# Patient Record
Sex: FEMALE | Race: WHITE | NOT HISPANIC OR LATINO | Employment: OTHER | URBAN - METROPOLITAN AREA
[De-identification: names, ages, dates, MRNs, and addresses within clinical notes are randomized per-mention and may not be internally consistent; named-entity substitution may affect disease eponyms.]

---

## 2017-05-15 ENCOUNTER — HOSPITAL ENCOUNTER (OUTPATIENT)
Dept: OTHER | Facility: HOSPITAL | Age: 67
Setting detail: SPECIMEN
Discharge: HOME | End: 2017-05-15
Attending: INTERNAL MEDICINE | Admitting: INTERNAL MEDICINE

## 2017-05-15 ENCOUNTER — ON CAMPUS - OUTPATIENT (AMBULATORY)
Dept: URBAN - METROPOLITAN AREA HOSPITAL 2 | Facility: HOSPITAL | Age: 67
End: 2017-05-15
Payer: COMMERCIAL

## 2017-05-15 ENCOUNTER — OFFICE (AMBULATORY)
Dept: URBAN - METROPOLITAN AREA CLINIC 64 | Facility: CLINIC | Age: 67
End: 2017-05-15
Payer: COMMERCIAL

## 2017-05-15 VITALS
DIASTOLIC BLOOD PRESSURE: 102 MMHG | DIASTOLIC BLOOD PRESSURE: 47 MMHG | RESPIRATION RATE: 16 BRPM | SYSTOLIC BLOOD PRESSURE: 181 MMHG | DIASTOLIC BLOOD PRESSURE: 52 MMHG | OXYGEN SATURATION: 97 % | TEMPERATURE: 98.1 F | SYSTOLIC BLOOD PRESSURE: 157 MMHG | SYSTOLIC BLOOD PRESSURE: 145 MMHG | DIASTOLIC BLOOD PRESSURE: 56 MMHG | SYSTOLIC BLOOD PRESSURE: 147 MMHG | HEART RATE: 66 BPM | OXYGEN SATURATION: 100 % | HEART RATE: 62 BPM | DIASTOLIC BLOOD PRESSURE: 65 MMHG | HEART RATE: 67 BPM | HEART RATE: 69 BPM | OXYGEN SATURATION: 95 % | HEIGHT: 62 IN | OXYGEN SATURATION: 99 % | RESPIRATION RATE: 15 BRPM | HEART RATE: 61 BPM | SYSTOLIC BLOOD PRESSURE: 117 MMHG | HEART RATE: 64 BPM | DIASTOLIC BLOOD PRESSURE: 74 MMHG | SYSTOLIC BLOOD PRESSURE: 122 MMHG | SYSTOLIC BLOOD PRESSURE: 166 MMHG | HEART RATE: 71 BPM | SYSTOLIC BLOOD PRESSURE: 101 MMHG | DIASTOLIC BLOOD PRESSURE: 97 MMHG | OXYGEN SATURATION: 98 % | DIASTOLIC BLOOD PRESSURE: 77 MMHG | WEIGHT: 166 LBS | DIASTOLIC BLOOD PRESSURE: 76 MMHG

## 2017-05-15 DIAGNOSIS — K62.1 RECTAL POLYP: ICD-10-CM

## 2017-05-15 DIAGNOSIS — K64.0 FIRST DEGREE HEMORRHOIDS: ICD-10-CM

## 2017-05-15 DIAGNOSIS — Z12.11 ENCOUNTER FOR SCREENING FOR MALIGNANT NEOPLASM OF COLON: ICD-10-CM

## 2017-05-15 LAB
GI HISTOLOGY: A. UNSPECIFIED: (no result)
GI HISTOLOGY: PDF REPORT: (no result)

## 2017-05-15 PROCEDURE — 45380 COLONOSCOPY AND BIOPSY: CPT | Mod: PT | Performed by: INTERNAL MEDICINE

## 2017-05-15 PROCEDURE — 88305 TISSUE EXAM BY PATHOLOGIST: CPT | Mod: 26 | Performed by: INTERNAL MEDICINE

## 2017-05-15 RX ADMIN — PROPOFOL: 10 INJECTION, EMULSION INTRAVENOUS at 13:45

## 2022-08-02 ENCOUNTER — OFFICE VISIT (OUTPATIENT)
Dept: NEUROLOGY | Facility: CLINIC | Age: 72
End: 2022-08-02

## 2022-08-02 VITALS
BODY MASS INDEX: 27.05 KG/M2 | HEART RATE: 62 BPM | SYSTOLIC BLOOD PRESSURE: 148 MMHG | WEIGHT: 147 LBS | HEIGHT: 62 IN | DIASTOLIC BLOOD PRESSURE: 76 MMHG | TEMPERATURE: 98.6 F

## 2022-08-02 DIAGNOSIS — R25.1 TREMOR: Primary | Chronic | ICD-10-CM

## 2022-08-02 PROCEDURE — 99202 OFFICE O/P NEW SF 15 MIN: CPT | Performed by: NURSE PRACTITIONER

## 2022-08-02 RX ORDER — MULTIPLE VITAMINS W/ MINERALS TAB 9MG-400MCG
1 TAB ORAL DAILY
COMMUNITY

## 2022-08-02 RX ORDER — PRIMIDONE 50 MG/1
TABLET ORAL
Qty: 120 TABLET | Refills: 2 | Status: SHIPPED | OUTPATIENT
Start: 2022-08-02 | End: 2022-08-02

## 2022-08-02 RX ORDER — MELATONIN
3000 DAILY
COMMUNITY

## 2022-08-02 RX ORDER — AMOXICILLIN 500 MG
1200 CAPSULE ORAL DAILY
COMMUNITY

## 2022-08-02 RX ORDER — PRIMIDONE 50 MG/1
TABLET ORAL
Qty: 120 TABLET | Refills: 3 | Status: SHIPPED | OUTPATIENT
Start: 2022-08-02 | End: 2022-08-02

## 2022-08-02 RX ORDER — MULTIVIT WITH MINERALS/LUTEIN
1000 TABLET ORAL DAILY
COMMUNITY

## 2022-08-02 RX ORDER — PRIMIDONE 50 MG/1
TABLET ORAL
Qty: 120 TABLET | Refills: 3 | Status: SHIPPED | OUTPATIENT
Start: 2022-08-02

## 2022-08-02 NOTE — PROGRESS NOTES
Subjective:     Patient ID: Marisela Alvarenga is a 71 y.o. female.    CHIEF COMPLAINT: Bilateral hand tremor    History of Present Illness Ms. Alvarenga is a very pleasant 71-year-old  female who was referred by her PCP PALOMO Hardin for essential tremor.  The patient states that she has had tremor off and on for several years but over the last 8 months is gotten markedly worse.  She states it is worse in the right hand versus the left and is causing difficulty eating and writing and doing fine motor activities.  She denies any trouble walking, freezing up, decreased sense of smell, cogwheel rigidity or shoulder stiffness.  She is unsure of any family history of tremor.  She denies any head or voice tremor.      Complaint: Tremors, hands R worse  Onset: years but has gotten worse in last 8 months  Location: BUE, R>L   Quality: Moderate   Severity: Can be severe   Duration: 24/7    Frequency:  Daily   Timing: in am   Context: Worse with anxious   Modifying factors: stress and nervous makes it worse   Associated Signs and symptoms:      Current meds: None    Testing:    EXAMINATION: CT HEAD WITHOUT CONTRAST.   DATE:04/20/2022   INDICATION: tremors COMPARISON: None available   TECHNIQUE: A routine noncontrast cranial CT scan was performed. Radiation dose reduction techniques were utilized per ALARA protocol.   FINDINGS: No hemorrhage, infarct, hydrocephalus, edema, mass, midline shift, or hyperdense vessel. Mild calcified atherosclerosis of the intracranial arteries. No orbital abnormality. Negative sella. The sinuses, middle ear, and mastoids are aerated.   CONCLUSION:   1. No acute abnormality.   2. Mild calcified atherosclerosis.   3. No specific diagnostic abnormality.   Dictated by: Dennis Madrid M.D.   Images and Report reviewed and interpreted by: Dennis Madrid M.D. 4/20/2022    Patient also has a TSH ordered by her PCP.      The following portions of the patient's history were reviewed and updated as  appropriate: allergies, current medications, past family history, past medical history, past social history, past surgical history and problem list.      Family History   Problem Relation Age of Onset   • Hypertension Mother    • Stroke Father    • Arthritis Father    • Diabetes Father    • Heart disease Father    • Stroke Brother        Past Medical History:   Diagnosis Date   • Breast cancer (HCC)        Social History     Socioeconomic History   • Marital status:    Tobacco Use   • Smoking status: Never Smoker   • Smokeless tobacco: Never Used   Vaping Use   • Vaping Use: Never used   Substance and Sexual Activity   • Alcohol use: Never   • Drug use: Never         Current Outpatient Medications:   •  Apple Cider Vinegar 300 MG tablet, Take  by mouth., Disp: , Rfl:   •  Calcium Carbonate-Vitamin D (calcium-vitamin D) 500-200 MG-UNIT tablet per tablet, Take 1 tablet by mouth Daily., Disp: , Rfl:   •  cholecalciferol (VITAMIN D3) 25 MCG (1000 UT) tablet, Take 3,000 Units by mouth Daily., Disp: , Rfl:   •  multivitamin with minerals (HAIR SKIN AND NAILS FORMULA PO), Take 1 tablet by mouth Daily., Disp: , Rfl:   •  Omega-3 Fatty Acids (fish oil) 1200 MG capsule capsule, Take 1,200 mg by mouth Daily., Disp: , Rfl:   •  primidone (MYSOLINE) 50 MG tablet, Take 1 tab at bedtime for 1 wk, then 1 tab twice a day for 1 wk, then 2 tab twice a day, Disp: 120 tablet, Rfl: 3  •  vitamin E 1000 UNIT capsule, Take 1,000 Units by mouth Daily., Disp: , Rfl:     Review of Systems   Constitutional: Negative.    HENT: Positive for postnasal drip.    Eyes: Negative.    Respiratory: Negative.    Cardiovascular: Negative.    Gastrointestinal: Positive for constipation.   Endocrine: Negative.    Genitourinary: Positive for urgency.   Musculoskeletal: Positive for neck pain and neck stiffness.   Neurological: Positive for dizziness, light-headedness and headaches.   Psychiatric/Behavioral: Positive for sleep disturbance. The patient  is nervous/anxious.         I have reviewed ROS completed by medical assistant.     Objective:    Neurologic Exam     Mental Status   Oriented to person, place, and time.   Speech: speech is normal     Cranial Nerves     CN III, IV, VI   Pupils are equal, round, and reactive to light.    Gait, Coordination, and Reflexes     Gait  Gait: normal    Coordination   Finger to nose coordination: normal  Tandem walking coordination: normal    Reflexes   Right brachioradialis: 2+  Left brachioradialis: 2+  Right biceps: 1+  Left biceps: 1+  Right triceps: 1+  Left triceps: 1+  Right patellar: 2+  Left patellar: 2+  Right achilles: 2+  Left achilles: 2+      Physical Exam  Vitals and nursing note reviewed.   Constitutional:       Appearance: Normal appearance. She is well-developed and well-groomed.   HENT:      Head: Normocephalic.      Right Ear: Hearing normal.      Left Ear: Hearing normal.      Nose: Nose normal.      Mouth/Throat:      Lips: Pink.      Mouth: Mucous membranes are moist.   Eyes:      General: No visual field deficit.     Extraocular Movements: Extraocular movements intact.      Pupils: Pupils are equal, round, and reactive to light.   Cardiovascular:      Rate and Rhythm: Normal rate and regular rhythm.      Pulses: Normal pulses.      Heart sounds: Normal heart sounds, S1 normal and S2 normal.   Pulmonary:      Effort: Pulmonary effort is normal.      Breath sounds: Normal breath sounds.   Musculoskeletal:         General: Normal range of motion.      Cervical back: Full passive range of motion without pain and normal range of motion.      Comments: 5/5 all exremities including , plantar and dorsiflexion    Skin:     General: Skin is warm and dry.   Neurological:      General: No focal deficit present.      Mental Status: She is alert and oriented to person, place, and time.      Cranial Nerves: Cranial nerves are intact. No cranial nerve deficit, dysarthria or facial asymmetry.      Sensory:  Sensation is intact. No sensory deficit.      Motor: Tremor (BUE: R>L ) present. No weakness, atrophy, abnormal muscle tone, seizure activity or pronator drift.      Coordination: Coordination is intact. Romberg sign negative. Coordination normal. Finger-Nose-Finger Test and Heel to Shin Test normal. Rapid alternating movements normal.      Gait: Gait is intact. Gait and tandem walk normal.      Deep Tendon Reflexes: Reflexes normal. Babinski sign absent on the right side. Babinski sign absent on the left side.      Reflex Scores:       Tricep reflexes are 1+ on the right side and 1+ on the left side.       Bicep reflexes are 1+ on the right side and 1+ on the left side.       Brachioradialis reflexes are 2+ on the right side and 2+ on the left side.       Patellar reflexes are 2+ on the right side and 2+ on the left side.       Achilles reflexes are 2+ on the right side and 2+ on the left side.  Psychiatric:         Attention and Perception: Attention and perception normal.         Mood and Affect: Mood normal.         Speech: Speech normal.         Behavior: Behavior normal. Behavior is cooperative.         Assessment/Plan:   She will be prescribed primidone 50 mg titrating up to a maximum dose of 100 mg twice daily.  She is to call if she has any problems with the medication.  She was notified that it can make her drowsy and to increase  The dose on the weekend.  She was also told to hold at any level that controlled the tremor.  She is to call with any questions or concerns.    Diagnoses and all orders for this visit:    1. Tremor (Primary)  -     Discontinue: primidone (MYSOLINE) 50 MG tablet; Take 1 tab at bedtime for 1 wk, then 1 tab twice a day for 1 wk, then 2 tab twice a day  Dispense: 120 tablet; Refill: 2  -     Discontinue: primidone (MYSOLINE) 50 MG tablet; Take 1 tab at bedtime for 1 wk, then 1 tab twice a day for 1 wk, then 2 tab twice a day  Dispense: 120 tablet; Refill: 3  -     primidone  (MYSOLINE) 50 MG tablet; Take 1 tab at bedtime for 1 wk, then 1 tab twice a day for 1 wk, then 2 tab twice a day  Dispense: 120 tablet; Refill: 3        Return in about 3 months (around 11/2/2022) for Next scheduled follow up Hazel Alfred .    I spent 27 minutes caring for Marisela on this date of service. This time includes time spent by me in the following activities: obtaining and/or reviewing a separately obtained history, performing a medically appropriate examination and/or evaluation, counseling and educating the patient/family/caregiver, ordering medications, tests, or procedures and documenting information in the medical record.      This document has been electronically signed by Hazel CULVER on August 2, 2022 10:32 EDT

## 2022-08-08 ENCOUNTER — TELEPHONE (OUTPATIENT)
Dept: NEUROLOGY | Facility: CLINIC | Age: 72
End: 2022-08-08

## 2022-08-08 NOTE — TELEPHONE ENCOUNTER
Ms. Alvarenga phoned stating that she had taken 50 mg of Mysoline at bedtime and it made her sleep until 10:00 the following morning.  She states that she also got up and was a little bit dizzy and nauseated in the morning.  She was told to take half a pill or 25 mg with a meal at suppertime and try that to see if she could tolerate that dose.  She was notified that if she could not tolerate that dose we may need to look into a different medication.  She states she will call back.

## 2022-08-08 NOTE — TELEPHONE ENCOUNTER
Caller: ANTHONY PASCUAL    Relationship: SELF    What medications are you currently taking:   Current Outpatient Medications on File Prior to Visit   Medication Sig Dispense Refill   • Apple Cider Vinegar 300 MG tablet Take  by mouth.     • Calcium Carbonate-Vitamin D (calcium-vitamin D) 500-200 MG-UNIT tablet per tablet Take 1 tablet by mouth Daily.     • cholecalciferol (VITAMIN D3) 25 MCG (1000 UT) tablet Take 3,000 Units by mouth Daily.     • multivitamin with minerals (HAIR SKIN AND NAILS FORMULA PO) Take 1 tablet by mouth Daily.     • Omega-3 Fatty Acids (fish oil) 1200 MG capsule capsule Take 1,200 mg by mouth Daily.     • primidone (MYSOLINE) 50 MG tablet Take 1 tab at bedtime for 1 wk, then 1 tab twice a day for 1 wk, then 2 tab twice a day 120 tablet 3   • vitamin E 1000 UNIT capsule Take 1,000 Units by mouth Daily.       No current facility-administered medications on file prior to visit.        Which medication are you concerned about: primidone (MYSOLINE) 50 MG tablet    Who prescribed you this medication:INDIGO SAMANIEGO    What are your concerns: PT WOULD LIKE TO TALK TO INDIGO ABOUT THE MEDICATION  primidone (MYSOLINE) 50 MG tablet.    PLEASE CALL HER BACK -[phone]

## 2022-11-11 NOTE — PROGRESS NOTES
Subjective: tremors    Patient ID: Marisela Alvarenga is a 72 y.o. female.    History of Present Illness Ms. Alvarenga is a very pleasant 72-year-old  female who presented today for follow-up on essential tremor.  She has been prescribed primidone but is very sensitive to the medication.  She had not taken it for quite a while due to sleepiness with it but has restarted taking one fourth or 12.5 mg at  6 PM and tolerating very well.  She has not noticed a change in her tremor at this dose but is trying to develop a tolerance to the medication.  I also discussed possible Inderal however her blood pressure is very good at 125/69 and may cause a drop in her pressure.  To date she states it just causes sleepiness and a little imbalance feeling which is tolerable.  She was encouraged to take a fourth a tablet at 6 PM until it is tolerated then increase to one half a tablet at 6 PM.  Another suggestion was after she is tolerating a half a tablet at 6 PM she could try 1/4 pill in the a.m. and half in the p.m. and see if that controls her tremor throughout the day.  She was encouraged to do a very slow titration and was notified that as we age we do not metabolize medicines as fast as we did when younger.She would like to continue at a low dose.    Testing:   DATE:04/20/2022     INDICATION: tremors     COMPARISON: None available     TECHNIQUE: A routine noncontrast cranial CT scan was performed. Radiation dose reduction techniques were utilized per ALARA protocol.     FINDINGS: No hemorrhage, infarct, hydrocephalus, edema, mass, midline shift, or hyperdense vessel. Mild calcified atherosclerosis of the intracranial arteries. No orbital abnormality. Negative sella. The sinuses, middle ear, and mastoids are aerated.     CONCLUSION:   1. No acute abnormality.   2. Mild calcified atherosclerosis.   3. No specific diagnostic abnormality.     Dictated by: Dennis Madrid M.D.        The following portions of the patient's history  were reviewed and updated as appropriate: allergies, current medications, past family history, past medical history, past social history, past surgical history and problem list.    Family History   Problem Relation Age of Onset   • Hypertension Mother    • Stroke Father    • Arthritis Father    • Diabetes Father    • Heart disease Father    • Stroke Brother        Past Medical History:   Diagnosis Date   • Breast cancer (HCC)        Social History     Socioeconomic History   • Marital status:    Tobacco Use   • Smoking status: Never   • Smokeless tobacco: Never   Vaping Use   • Vaping Use: Never used   Substance and Sexual Activity   • Alcohol use: Never   • Drug use: Never         Current Outpatient Medications:   •  Apple Cider Vinegar 300 MG tablet, Take  by mouth., Disp: , Rfl:   •  ascorbic acid (VITAMIN C) 250 MG tablet, Take 1 tablet by mouth Daily., Disp: , Rfl:   •  atorvastatin (LIPITOR) 10 MG tablet, , Disp: , Rfl:   •  benzonatate (TESSALON) 100 MG capsule, Take 1 capsule by mouth., Disp: , Rfl:   •  Calcium Carbonate-Vitamin D (calcium-vitamin D) 500-200 MG-UNIT tablet per tablet, Take 1 tablet by mouth Daily., Disp: , Rfl:   •  cholecalciferol (VITAMIN D3) 25 MCG (1000 UT) tablet, Take 3,000 Units by mouth Daily., Disp: , Rfl:   •  multivitamin with minerals tablet tablet, Take 1 tablet by mouth Daily., Disp: , Rfl:   •  Omega-3 Fatty Acids (fish oil) 1200 MG capsule capsule, Take 1,200 mg by mouth Daily., Disp: , Rfl:   •  primidone (MYSOLINE) 50 MG tablet, Take 1 tab at bedtime for 1 wk, then 1 tab twice a day for 1 wk, then 2 tab twice a day (Patient taking differently: 1 tablet. Take 1 tab at bedtime), Disp: 120 tablet, Rfl: 3  •  vitamin E 1000 UNIT capsule, Take 1,000 Units by mouth Daily., Disp: , Rfl:     Review of Systems   Constitutional: Negative.    HENT: Negative.    Eyes: Negative.    Respiratory: Negative.    Cardiovascular: Negative.    Gastrointestinal: Negative.     Genitourinary: Negative.    Musculoskeletal: Negative.    Neurological: Positive for tremors.   Psychiatric/Behavioral: Negative.           I have reviewed ROS completed by medical assistant.     Objective:    Neurologic Exam     Mental Status   Oriented to person, place, and time.   Speech: speech is normal     Cranial Nerves   Cranial nerves II through XII intact.     CN III, IV, VI   Pupils are equal, round, and reactive to light.    Gait, Coordination, and Reflexes     Gait  Gait: normal      Physical Exam  Vitals reviewed.   Constitutional:       Appearance: Normal appearance. She is well-developed and normal weight.      Interventions: Face mask in place.   HENT:      Head: Normocephalic and atraumatic.      Right Ear: Hearing normal.      Left Ear: Hearing normal.   Eyes:      Pupils: Pupils are equal, round, and reactive to light.   Cardiovascular:      Rate and Rhythm: Normal rate.      Pulses: Normal pulses.   Pulmonary:      Effort: Pulmonary effort is normal.   Musculoskeletal:         General: Normal range of motion.      Cervical back: Normal range of motion.   Skin:     General: Skin is warm and dry.   Neurological:      Mental Status: She is alert and oriented to person, place, and time.      Cranial Nerves: Cranial nerves 2-12 are intact.      Motor: No tremor.      Gait: Gait is intact. Gait normal.   Psychiatric:         Attention and Perception: Attention normal.         Mood and Affect: Mood normal.         Speech: Speech normal.         Behavior: Behavior normal. Behavior is cooperative.         Assessment/Plan:      Diagnoses and all orders for this visit:    1. Tremor (Primary)    Discussion: The patient will slowly titrate primidone up to 1/2 tablet twice daily as tolerated.  She will be scheduled back for a follow-up visit in 6 months and was told to call if she would like to try a different medication.    Return in about 6 months (around 5/14/2023) for Hazel Alfred.     I spent 22  minutes caring for Marisela on this date of service. This time includes time spent by me in the following activities: reviewing tests, obtaining and/or reviewing a separately obtained history, performing a medically appropriate examination and/or evaluation, counseling and educating the patient/family/caregiver and documenting information in the medical record.      This document has been electronically signed by PALOMO Del Rio on November 14, 2022 10:22 EST

## 2022-11-14 ENCOUNTER — OFFICE VISIT (OUTPATIENT)
Dept: NEUROLOGY | Facility: CLINIC | Age: 72
End: 2022-11-14

## 2022-11-14 VITALS
HEIGHT: 62 IN | DIASTOLIC BLOOD PRESSURE: 69 MMHG | WEIGHT: 143 LBS | TEMPERATURE: 98.2 F | HEART RATE: 59 BPM | BODY MASS INDEX: 26.31 KG/M2 | SYSTOLIC BLOOD PRESSURE: 125 MMHG

## 2022-11-14 DIAGNOSIS — R25.1 TREMOR: Primary | ICD-10-CM

## 2022-11-14 PROBLEM — Z79.811 AROMATASE INHIBITOR USE: Status: ACTIVE | Noted: 2017-05-08

## 2022-11-14 PROCEDURE — 99213 OFFICE O/P EST LOW 20 MIN: CPT | Performed by: NURSE PRACTITIONER

## 2022-11-14 RX ORDER — ATORVASTATIN CALCIUM 10 MG/1
10 TABLET, FILM COATED ORAL DAILY
Qty: 30 TABLET | Refills: 11 | COMMUNITY
Start: 2022-09-29 | End: 2022-11-14

## 2022-11-14 RX ORDER — BENZONATATE 100 MG/1
100 CAPSULE ORAL
COMMUNITY
Start: 2022-10-18

## 2022-11-14 RX ORDER — ASCORBIC ACID 250 MG
250 TABLET ORAL DAILY
COMMUNITY

## 2022-11-14 RX ORDER — ATORVASTATIN CALCIUM 10 MG/1
TABLET, FILM COATED ORAL
COMMUNITY
Start: 2022-09-29